# Patient Record
Sex: FEMALE | Race: WHITE | URBAN - METROPOLITAN AREA
[De-identification: names, ages, dates, MRNs, and addresses within clinical notes are randomized per-mention and may not be internally consistent; named-entity substitution may affect disease eponyms.]

---

## 2017-02-13 DIAGNOSIS — F98.8 ATTENTION DEFICIT DISORDER: ICD-10-CM

## 2017-02-13 RX ORDER — DEXTROAMPHETAMINE SACCHARATE, AMPHETAMINE ASPARTATE MONOHYDRATE, DEXTROAMPHETAMINE SULFATE AND AMPHETAMINE SULFATE 5; 5; 5; 5 MG/1; MG/1; MG/1; MG/1
20 CAPSULE, EXTENDED RELEASE ORAL DAILY
Qty: 30 CAPSULE | Refills: 0 | Status: SHIPPED | OUTPATIENT
Start: 2017-02-13 | End: 2017-03-07

## 2017-02-13 NOTE — TELEPHONE ENCOUNTER
amphetamine-dextroamphetamine (ADDERALL XR) 20 MG per 24 hr capsule      Last Written Prescription Date:  12/20/16  Last Fill Quantity: 30,   # refills: 0  Last Office Visit with [facility], [facility] or [facility] prescribing provider: 9/7/16  Future Office visit:       Routing refill request to provider for review/approval because:  Drug not on the [facility], [facility] or [facility] refill protocol or controlled substance

## 2017-02-13 NOTE — TELEPHONE ENCOUNTER
[facility] Call Center    Phone Message    Name of Caller: Dottie    Phone Number: Home number on file [phone] (home)    Best time to return call: ANY    May a detailed message be left on voicemail: no    Relation to patient: Self    Reason for Call: Medication Refill Request    Has the patient contacted the pharmacy for the refill? Yes   Name of medication being requested: Adderall  Provider who prescribed the medication: [place]  Pharmacy: Call to   Date medication is needed: ASAP         Action Taken: Message routed to:  Primary Care p [phone]

## 2017-02-14 NOTE — TELEPHONE ENCOUNTER
Rx placed at check in #1 desk. Called patient on home number to inform. Scheduled future physical with previsit lab appointment on 3/7/17.  Timothy Farris, CMA

## 2017-02-27 DIAGNOSIS — Z13.0 SCREENING FOR DEFICIENCY ANEMIA: ICD-10-CM

## 2017-02-27 DIAGNOSIS — E03.9 ACQUIRED HYPOTHYROIDISM: ICD-10-CM

## 2017-02-27 DIAGNOSIS — R73.01 ELEVATED FASTING BLOOD SUGAR: ICD-10-CM

## 2017-02-27 DIAGNOSIS — I10 ESSENTIAL HYPERTENSION WITH GOAL BLOOD PRESSURE LESS THAN 140/90: ICD-10-CM

## 2017-02-27 DIAGNOSIS — Z00.00 ROUTINE GENERAL MEDICAL EXAMINATION AT A HEALTH CARE FACILITY: ICD-10-CM

## 2017-02-27 DIAGNOSIS — R80.9 MICROALBUMINURIA: ICD-10-CM

## 2017-02-27 DIAGNOSIS — Z12.11 SCREENING FOR COLON CANCER: Primary | ICD-10-CM

## 2017-02-27 DIAGNOSIS — Z11.59 NEED FOR HEPATITIS C SCREENING TEST: ICD-10-CM

## 2017-02-27 DIAGNOSIS — Z13.1 SCREENING FOR DIABETES MELLITUS: ICD-10-CM

## 2017-03-07 ENCOUNTER — OFFICE VISIT (OUTPATIENT)
Dept: PEDIATRICS | Facility: CLINIC | Age: 57
End: 2017-03-07
Payer: COMMERCIAL

## 2017-03-07 VITALS
HEART RATE: 98 BPM | OXYGEN SATURATION: 99 % | WEIGHT: 139.5 LBS | DIASTOLIC BLOOD PRESSURE: 78 MMHG | TEMPERATURE: 97.9 F | HEIGHT: 64 IN | BODY MASS INDEX: 23.82 KG/M2 | SYSTOLIC BLOOD PRESSURE: 118 MMHG

## 2017-03-07 DIAGNOSIS — E03.9 ACQUIRED HYPOTHYROIDISM: ICD-10-CM

## 2017-03-07 DIAGNOSIS — E06.3 HASHIMOTO'S THYROIDITIS: ICD-10-CM

## 2017-03-07 DIAGNOSIS — F98.8 ATTENTION DEFICIT DISORDER: ICD-10-CM

## 2017-03-07 DIAGNOSIS — Z12.4 SCREENING FOR CERVICAL CANCER: Primary | ICD-10-CM

## 2017-03-07 DIAGNOSIS — Z00.00 ROUTINE GENERAL MEDICAL EXAMINATION AT A HEALTH CARE FACILITY: ICD-10-CM

## 2017-03-07 DIAGNOSIS — N95.1 MENOPAUSAL SYNDROME (HOT FLASHES): ICD-10-CM

## 2017-03-07 DIAGNOSIS — F51.05 INSOMNIA SECONDARY TO ANXIETY: ICD-10-CM

## 2017-03-07 DIAGNOSIS — F41.9 INSOMNIA SECONDARY TO ANXIETY: ICD-10-CM

## 2017-03-07 DIAGNOSIS — Z28.21 IMMUNIZATION NOT CARRIED OUT BECAUSE OF PATIENT REFUSAL: ICD-10-CM

## 2017-03-07 DIAGNOSIS — F41.1 GENERALIZED ANXIETY DISORDER: ICD-10-CM

## 2017-03-07 DIAGNOSIS — R73.01 ELEVATED FASTING GLUCOSE: ICD-10-CM

## 2017-03-07 DIAGNOSIS — Z80.3 FAMILY HISTORY OF BREAST CANCER: ICD-10-CM

## 2017-03-07 DIAGNOSIS — Z13.0 SCREENING FOR DEFICIENCY ANEMIA: ICD-10-CM

## 2017-03-07 DIAGNOSIS — Z11.59 NEED FOR HEPATITIS C SCREENING TEST: ICD-10-CM

## 2017-03-07 DIAGNOSIS — R73.01 ELEVATED FASTING BLOOD SUGAR: ICD-10-CM

## 2017-03-07 DIAGNOSIS — R80.9 MICROALBUMINURIA: ICD-10-CM

## 2017-03-07 DIAGNOSIS — F33.1 MAJOR DEPRESSIVE DISORDER, RECURRENT EPISODE, MODERATE (H): ICD-10-CM

## 2017-03-07 DIAGNOSIS — I10 ESSENTIAL HYPERTENSION WITH GOAL BLOOD PRESSURE LESS THAN 140/90: ICD-10-CM

## 2017-03-07 DIAGNOSIS — Z12.11 SCREENING FOR COLON CANCER: ICD-10-CM

## 2017-03-07 DIAGNOSIS — E78.5 HYPERLIPIDEMIA LDL GOAL <130: ICD-10-CM

## 2017-03-07 LAB
ALBUMIN SERPL-MCNC: 4.1 G/DL (ref 3.4–5)
ALP SERPL-CCNC: 61 U/L (ref 40–150)
ALT SERPL W P-5'-P-CCNC: 27 U/L (ref 0–50)
ANION GAP SERPL CALCULATED.3IONS-SCNC: 7 MMOL/L (ref 3–14)
AST SERPL W P-5'-P-CCNC: 14 U/L (ref 0–45)
BILIRUB SERPL-MCNC: 0.4 MG/DL (ref 0.2–1.3)
BUN SERPL-MCNC: 13 MG/DL (ref 7–30)
CALCIUM SERPL-MCNC: 9.2 MG/DL (ref 8.5–10.1)
CHLORIDE SERPL-SCNC: 106 MMOL/L (ref 94–109)
CHOLEST SERPL-MCNC: 234 MG/DL
CO2 SERPL-SCNC: 28 MMOL/L (ref 20–32)
CREAT SERPL-MCNC: 0.78 MG/DL (ref 0.52–1.04)
CREAT UR-MCNC: 226 MG/DL
DIFFERENTIAL METHOD BLD: NORMAL
EOSINOPHIL # BLD AUTO: 0.1 10E9/L (ref 0–0.7)
EOSINOPHIL NFR BLD AUTO: 2 %
ERYTHROCYTE [DISTWIDTH] IN BLOOD BY AUTOMATED COUNT: 12.6 % (ref 10–15)
GFR SERPL CREATININE-BSD FRML MDRD: 76 ML/MIN/1.7M2
GLUCOSE SERPL-MCNC: 96 MG/DL (ref 70–99)
HCT VFR BLD AUTO: 42.1 % (ref 35–47)
HCV AB SERPL QL IA: NORMAL
HDLC SERPL-MCNC: 80 MG/DL
HGB BLD-MCNC: 14.7 G/DL (ref 11.7–15.7)
LDLC SERPL CALC-MCNC: 134 MG/DL
LYMPHOCYTES # BLD AUTO: 2.7 10E9/L (ref 0.8–5.3)
LYMPHOCYTES NFR BLD AUTO: 58 %
MCH RBC QN AUTO: 31.5 PG (ref 26.5–33)
MCHC RBC AUTO-ENTMCNC: 34.9 G/DL (ref 31.5–36.5)
MCV RBC AUTO: 90 FL (ref 78–100)
MICROALBUMIN UR-MCNC: 147 MG/L
MICROALBUMIN/CREAT UR: 65.04 MG/G CR (ref 0–25)
MONOCYTES # BLD AUTO: 0.3 10E9/L (ref 0–1.3)
MONOCYTES NFR BLD AUTO: 7 %
NEUTROPHILS # BLD AUTO: 1.6 10E9/L (ref 1.6–8.3)
NEUTROPHILS NFR BLD AUTO: 33 %
NONHDLC SERPL-MCNC: 154 MG/DL
PLATELET # BLD AUTO: 272 10E9/L (ref 150–450)
PLATELET # BLD EST: NORMAL 10*3/UL
POTASSIUM SERPL-SCNC: 3.8 MMOL/L (ref 3.4–5.3)
PROT SERPL-MCNC: 7.8 G/DL (ref 6.8–8.8)
RBC # BLD AUTO: 4.67 10E12/L (ref 3.8–5.2)
RBC MORPH BLD: NORMAL
SODIUM SERPL-SCNC: 141 MMOL/L (ref 133–144)
T4 FREE SERPL-MCNC: 1.08 NG/DL (ref 0.76–1.46)
TRIGL SERPL-MCNC: 102 MG/DL
TSH SERPL DL<=0.005 MIU/L-ACNC: 5.38 MU/L (ref 0.4–4)
WBC # BLD AUTO: 4.7 10E9/L (ref 4–11)

## 2017-03-07 PROCEDURE — 80050 GENERAL HEALTH PANEL: CPT | Performed by: FAMILY MEDICINE

## 2017-03-07 PROCEDURE — G0145 SCR C/V CYTO,THINLAYER,RESCR: HCPCS | Performed by: FAMILY MEDICINE

## 2017-03-07 PROCEDURE — 36415 COLL VENOUS BLD VENIPUNCTURE: CPT | Performed by: FAMILY MEDICINE

## 2017-03-07 PROCEDURE — 82043 UR ALBUMIN QUANTITATIVE: CPT | Performed by: FAMILY MEDICINE

## 2017-03-07 PROCEDURE — 80061 LIPID PANEL: CPT | Performed by: FAMILY MEDICINE

## 2017-03-07 PROCEDURE — 87624 HPV HI-RISK TYP POOLED RSLT: CPT | Performed by: FAMILY MEDICINE

## 2017-03-07 PROCEDURE — 99396 PREV VISIT EST AGE 40-64: CPT | Performed by: FAMILY MEDICINE

## 2017-03-07 PROCEDURE — 84439 ASSAY OF FREE THYROXINE: CPT | Performed by: FAMILY MEDICINE

## 2017-03-07 PROCEDURE — 86803 HEPATITIS C AB TEST: CPT | Performed by: FAMILY MEDICINE

## 2017-03-07 RX ORDER — SIMVASTATIN 10 MG
TABLET ORAL
Qty: 45 TABLET | Refills: 3 | Status: SHIPPED | OUTPATIENT
Start: 2017-03-07 | End: 2017-09-26

## 2017-03-07 RX ORDER — AMLODIPINE BESYLATE 10 MG/1
TABLET ORAL
Qty: 90 TABLET | Refills: 1 | Status: SHIPPED | OUTPATIENT
Start: 2017-03-07 | End: 2017-09-26

## 2017-03-07 RX ORDER — DEXTROAMPHETAMINE SACCHARATE, AMPHETAMINE ASPARTATE MONOHYDRATE, DEXTROAMPHETAMINE SULFATE AND AMPHETAMINE SULFATE 5; 5; 5; 5 MG/1; MG/1; MG/1; MG/1
20 CAPSULE, EXTENDED RELEASE ORAL DAILY
Qty: 30 CAPSULE | Refills: 0 | Status: SHIPPED | OUTPATIENT
Start: 2017-03-07 | End: 2017-05-23

## 2017-03-07 ASSESSMENT — PATIENT HEALTH QUESTIONNAIRE - PHQ9: 5. POOR APPETITE OR OVEREATING: MORE THAN HALF THE DAYS

## 2017-03-07 ASSESSMENT — ANXIETY QUESTIONNAIRES
6. BECOMING EASILY ANNOYED OR IRRITABLE: MORE THAN HALF THE DAYS
7. FEELING AFRAID AS IF SOMETHING AWFUL MIGHT HAPPEN: NOT AT ALL
2. NOT BEING ABLE TO STOP OR CONTROL WORRYING: MORE THAN HALF THE DAYS
1. FEELING NERVOUS, ANXIOUS, OR ON EDGE: MORE THAN HALF THE DAYS
5. BEING SO RESTLESS THAT IT IS HARD TO SIT STILL: MORE THAN HALF THE DAYS
GAD7 TOTAL SCORE: 12
3. WORRYING TOO MUCH ABOUT DIFFERENT THINGS: MORE THAN HALF THE DAYS

## 2017-03-07 ASSESSMENT — PAIN SCALES - GENERAL: PAINLEVEL: NO PAIN (0)

## 2017-03-07 NOTE — PROGRESS NOTES
SUBJECTIVE:     CC: Dottie Webb is an 56 year old woman who presents for preventive health visit.     Healthy Habits:    Do you get at least three servings of calcium containing foods daily (dairy, green leafy vegetables, etc.)? yes    Amount of exercise or daily activities, outside of work: none    Problems taking medications regularly No    Medication side effects: No    Have you had an eye exam in the past two years? yes    Do you see a dentist twice per year? yes    Do you have sleep apnea, excessive snoring or daytime drowsiness?no        Hyperlipidemia Follow-Up      Rate your low fat/cholesterol diet?: good    Taking statin?  Yes, no muscle aches from statin, but not daily conssitently    Other lipid medications/supplements?:  none     Hypertension Follow-up      Outpatient blood pressures are not being checked.    Low Salt Diet: no added salt       Depression and Anxiety Follow-Up    Status since last visit: No change    Other associated symptoms:None    Complicating factors:     Significant life event: No     Current substance abuse: None    PHQ-9 SCORE 6/14/2011   Total Score 4     No flowsheet data found.     PHQ-9  English      PHQ-9   Any Language     GAD7       Today's PHQ-2 Score:   PHQ-2 ( 1999 Pfizer) 3/7/2017 3/1/2016   Q1: Little interest or pleasure in doing things 1 0   Q2: Feeling down, depressed or hopeless 1 0   PHQ-2 Score 2 0       Abuse: Current or Past(Physical, Sexual or Emotional)- No  Do you feel safe in your environment - Yes    Social History   Substance Use Topics     Smoking status: Former Smoker     Quit date: 10/1/1999     Smokeless tobacco: Never Used     Alcohol use Yes      Comment: 2-3 drinks per wk     The patient does not drink >3 drinks per day nor >7 drinks per week.    Recent Labs   Lab Test  09/07/16   0927  08/28/15   0842  06/24/14   0921   CHOL  223*  219*  184   HDL  67  59  64   LDL  128*  112  79   TRIG  141  241*  205*   CHOLHDLRATIO   --   3.7  2.9    [facility]  156*   --    --        Reviewed orders with patient.  Reviewed health maintenance and updated orders accordingly - Yes    Mammo Decision Support:  Patient over age 50, mutual decision to screen reflected in health maintenance.    Pertinent mammograms are reviewed under the imaging tab.  History of abnormal Pap smear: NO - age 30- 65 PAP every 3 years recommended    Reviewed and updated as needed this visit by clinical staff  Tobacco  Allergies  Meds  Med Hx  Surg Hx  Fam Hx  Soc Hx        Reviewed and updated as needed this visit by Provider          Past Medical History   Diagnosis Date     Elevated lipids      Hypertension      Hypothyroid       Past Surgical History   Procedure Laterality Date     Gyn surgery       2010 ablation     Tubal ligation  2003     Obstetric History     No data available          ROS:  C: NEGATIVE for fever, chills, change in weight  I: NEGATIVE for worrisome rashes, moles or lesions  E: NEGATIVE for vision changes or irritation  ENT: NEGATIVE for ear, mouth and throat problems  R: NEGATIVE for significant cough or SOB  B: NEGATIVE for masses, tenderness or discharge  CV: NEGATIVE for chest pain, palpitations or peripheral edema  CV: Hx HTN  GI: NEGATIVE for nausea, abdominal pain, heartburn, or change in bowel habits  : NEGATIVE for unusual urinary or vaginal symptoms. No vaginal bleeding.  M: NEGATIVE for significant arthralgias or myalgia  N: NEGATIVE for weakness, dizziness or paresthesias  E: NEGATIVE for temperature intolerance, skin/hair changes  ENDOCRINE: Hx thyroid disease  H: NEGATIVE for bleeding problems  P: NEGATIVE for changes in mood or affect  PSYCHIATRIC: h/o ADD, HX anxiety and HX depression     Problem list, Medication list, Allergies, and Medical/Social/Surgical histories reviewed in [facility] and updated as appropriate.  Labs reviewed in EPIC  BP Readings from Last 3 Encounters:   03/07/17 118/78   09/07/16 (!) 142/94   03/01/16 106/70    Wt Readings from  Last 3 Encounters:   03/07/17 139 lb 8 oz (63.3 kg)   09/07/16 143 lb 9.6 oz (65.1 kg)   03/01/16 139 lb 11.2 oz (63.4 kg)                  Patient Active Problem List   Diagnosis     Hashimoto's thyroiditis     Generalized anxiety disorder     Chronic fatigue     Hyperlipidemia LDL goal <160     Plantar fasciitis     Anxiety     Attention deficit disorder     Menopausal syndrome (hot flashes)     Elevated fasting glucose     Acquired hypothyroidism     Microalbuminuria     Essential hypertension with goal blood pressure less than 140/90     Major depressive disorder, recurrent episode, moderate (H)     Insomnia secondary to anxiety     Elevated fasting blood sugar     Family history of breast cancer     Past Surgical History   Procedure Laterality Date     Gyn surgery       2010 ablation     Tubal ligation  2003       Social History   Substance Use Topics     Smoking status: Former Smoker     Quit date: 10/1/1999     Smokeless tobacco: Never Used     Alcohol use Yes      Comment: 2-3 drinks per wk     Family History   Problem Relation Age of Onset     C.A.D. Father      Hypertension Father      Lipids Father      CEREBROVASCULAR DISEASE Maternal Grandmother      Hypertension Brother      Lipids Brother      Blood Disease Brother      hepatitis C     CANCER Sister      throat, breast, upper GI     HEART DISEASE Sister      rhythm issues     Hypertension Mother      DIABETES Mother      DIABETES Maternal Grandfather      Prostate Cancer Paternal Grandfather          Current Outpatient Prescriptions   Medication Sig Dispense Refill     amphetamine-dextroamphetamine (ADDERALL XR) 20 MG per 24 hr capsule Take 1 capsule (20 mg) by mouth daily 30 capsule 0     amLODIPine (NORVASC) 10 MG tablet TAKE ONE TABLET BY MOUTH ONE TIME DAILY.  ASCEND [facility] ONLY 90 tablet 1     simvastatin (ZOCOR) 10 MG tablet take one-half tablet by mouth nightly at bedtime. 45 tablet 3     Omega 3-6-9 Fatty Acids (OMEGA 3-6-9 PO) Take 1 capsule by  "mouth daily       Coenzyme Q10 (CO Q 10 PO) Take 1 capsule by mouth daily       Probiotic Product (PROBIOTIC DAILY PO) Take 1 capsule by mouth daily       levothyroxine (SYNTHROID, LEVOTHROID) 75 MCG tablet Take 1 tablet (75 mcg) by mouth daily 90 tablet 3     ORDER FOR DME 2 Units. splint for plantar fascitis 2 Units 0     aspirin 81 MG tablet Take 1 tablet by mouth daily. 100 tablet 3     ACE NOT PRESCRIBED, INTENTIONAL, by Other route continuous prn.  0     CLARITIN 10 MG PO CAPS 1 CAPSULE DAILY PRN       [DISCONTINUED] amLODIPine (NORVASC) 10 MG tablet TAKE ONE TABLET BY MOUTH ONE TIME DAILY.  ASCEND [facility] ONLY 90 tablet 1     [DISCONTINUED] simvastatin (ZOCOR) 10 MG tablet take one-half tablet by mouth nightly at bedtime. 45 tablet 3     Allergies   Allergen Reactions     Lisinopril      Severe dry cough     Recent Labs   Lab Test  03/07/17   0854  09/07/16   0928  09/07/16   0927   08/28/15   0842   A1C   --    --   5.2   --   5.6   LDL  134*   --   128*   --   112   HDL  80   --   67   --   59   TRIG  102   --   141   --   241*   ALT  27   --   28   --   27   CR  0.78   --   0.73   < >   --    GFRESTIMATED  76   --   83   < >   --    GFRESTBLACK  >90   GFR Calc     --   >90   GFR Calc     < >   --    POTASSIUM  3.8   --   3.6   < >   --    TSH  5.38*  2.82   --    --   2.03    < > = values in this interval not displayed.      OBJECTIVE:     /78  Pulse 98  Temp 97.9  F (36.6  C) (Oral)  Ht 5' 3.75\" (1.619 m)  Wt 139 lb 8 oz (63.3 kg)  SpO2 99%  BMI 24.13 kg/m2  EXAM:  GENERAL APPEARANCE: healthy, alert and no distress  EYES: Eyes grossly normal to inspection, PERRL and conjunctivae and sclerae normal  HENT: ear canals and TM's normal, nose and mouth without ulcers or lesions, oropharynx clear and oral mucous membranes moist  NECK: no adenopathy, no asymmetry, masses, or scars and thyroid normal to palpation  RESP: lungs clear to auscultation - no rales, rhonchi or " wheezes  BREAST: normal without masses, tenderness or nipple discharge and no palpable axillary masses or adenopathy  CV: regular rate and rhythm, normal S1 S2, no S3 or S4, no murmur, click or rub, no peripheral edema and peripheral pulses strong  ABDOMEN: soft, nontender, no hepatosplenomegaly, no masses and bowel sounds normal   (female): normal female external genitalia, normal urethral meatus, vaginal mucosal atrophy noted, normal cervix, adnexae, and uterus without masses or abnormal discharge  MS: no musculoskeletal defects are noted and gait is age appropriate without ataxia  SKIN: no suspicious lesions or rashes  NEURO: Normal strength and tone, sensory exam grossly normal, mentation intact and speech normal  PSYCH: mentation appears normal and affect normal/bright    ASSESSMENT/PLAN:     1. Routine general medical examination at a health care facility  Discussed on regular exercises, daily calcium intake, healthy eating, self breast exams monthly and routine dental checks  - Pap imaged thin layer screen with HPV - recommended age 30 - 65 years (select HPV order below)  - HPV High Risk Types DNA Cervical    2. Attention deficit disorder  Stable, continue Adderall 20 mg daily, recheck in 6 months or sooner if needed  - amphetamine-dextroamphetamine (ADDERALL XR) 20 MG per 24 hr capsule; Take 1 capsule (20 mg) by mouth daily  Dispense: 30 capsule; Refill: 0    3. Essential hypertension with goal blood pressure less than 140/90  Last Basic Metabolic Panel:  Lab Results   Component Value Date     03/07/2017      Lab Results   Component Value Date    POTASSIUM 3.8 03/07/2017     Lab Results   Component Value Date    CHLORIDE 106 03/07/2017     Lab Results   Component Value Date    LEILANI 9.2 03/07/2017     Lab Results   Component Value Date    CO2 28 03/07/2017     Lab Results   Component Value Date    BUN 13 03/07/2017     Lab Results   Component Value Date    CR 0.78 03/07/2017     Lab Results   Component  Value Date    GLC 96 03/07/2017       Blood pressure is at goal, continue amlodipine 10 mg daily, low-salt diet, regular exercise, recheck in 6 months. Reviewed normal BMP lab results with patient  - amLODIPine (NORVASC) 10 MG tablet; TAKE ONE TABLET BY MOUTH ONE TIME DAILY.  ASCEND [facility] ONLY  Dispense: 90 tablet; Refill: 1    4. Major depressive disorder, recurrent episode, moderate (H)  Secondary to situational stressors  Patient wants to continue to monitor, continue relaxation techniques, regular exercises  Recheck in 6 months or sooner if needed      5. Generalized anxiety disorder  as above      6. Screening for cervical cancer    - Pap imaged thin layer screen with HPV - recommended age 30 - 65 years (select HPV order below)  - HPV High Risk Types DNA Cervical    7. Hyperlipidemia LDL goal <160  LDL Cholesterol Calculated   Date Value Ref Range Status   03/07/2017 134 (H) <100 mg/dL Final     Comment:     Above desirable:  100-129 mg/dl   Borderline High:  130-159 mg/dL   High:             160-189 mg/dL   Very high:       >189 mg/dl     ]  Emphasized on medication compliance in taking medications every day  Continue Zocor 5 mg daily, recheck lipids in 6 months  Continue healthy eating and regular exercise  - simvastatin (ZOCOR) 10 MG tablet; take one-half tablet by mouth nightly at bedtime.  Dispense: 45 tablet; Refill: 3    8. Family history of breast cancer  Multiple family members including her 61-year-old sister with breast cancers  Recommended genetic counseling for further evaluation  - CANCER RISK MGMT/CANCER GENETIC COUNSELING REFERRAL    9. Hashimoto's thyroiditis  Will f/u on results and call with recommendations.      10. Menopausal syndrome (hot flashes)  Patient is coping well, continue to monitor    11. Elevated fasting glucose  Glucose   Date Value Ref Range Status   03/07/2017 96 70 - 99 mg/dL Final     Comment:     Fasting specimen   ]      Negative test results reviewed with the patient and  "reassured.      12. Acquired hypothyroidism  Will f/u on results and call with recommendations.      13. Insomnia secondary to anxiety  Resolved, Continue sleep hygiene and monitor      (Z28.21) Immunization not carried out because of patient refusal  Comment:   Plan:  Patient refused Annual influenza and overdue TDAP          COUNSELING:   Reviewed preventive health counseling, as reflected in patient instructions  Special attention given to:        Regular exercise       Healthy diet/nutrition       Osteoporosis Prevention/Bone Health       Colon cancer screening       Consider Hep C screening for patients born between 1945 and 1965       (Debra)menopause management       The 10-year ASCVD risk score (Shirley LEVI JrJonel, et al., 2013) is: 1.9%    Values used to calculate the score:      Age: 56 years      Sex: Female      Is Non- : No      Diabetic: No      Tobacco smoker: No      Systolic Blood Pressure: 118 mmHg      Is Prescribed Antihypertensives: Yes      HDL Cholesterol: 80 mg/dL      Total Cholesterol: 234 mg/dL       Advance Care Planning         reports that she quit smoking about 17 years ago. She has never used smokeless tobacco.    Estimated body mass index is 24.13 kg/(m^2) as calculated from the following:    Height as of this encounter: 5' 3.75\" (1.619 m).    Weight as of this encounter: 139 lb 8 oz (63.3 kg).       Counseling Resources:  ATP IV Guidelines  Pooled Cohorts Equation Calculator  Breast Cancer Risk Calculator  FRAX Risk Assessment  ICSI Preventive Guidelines  Dietary Guidelines for Americans, 2010  USDA's MyPlate  ASA Prophylaxis  Lung CA Screening    Iraida Stark MD  [facility]  Chart documentation done in part with Dragon Voice recognition Software. Although reviewed after completion, some word and grammatical error may remain.    "

## 2017-03-07 NOTE — MR AVS SNAPSHOT
After Visit Summary   3/7/2017    Dottie Webb    MRN: [id number]           Patient Information     Date Of Birth          1960        Visit Information        Provider Department      3/7/2017 9:20 AM Iraida Stark MD [facility]        Today's Diagnoses     Screening for cervical cancer    -  1    Routine general medical examination at a health care facility        Attention deficit disorder        Essential hypertension with goal blood pressure less than 140/90        Major depressive disorder, recurrent episode, moderate (H)        Generalized anxiety disorder        Hyperlipidemia LDL goal <160        Family history of breast cancer          Care Instructions    Get the FIT test done today    Schedule for mammogram  Schedule for genetic counseling  Schedule for recheck in 6 months      Preventive Health Recommendations  Female Ages 50 - 64    Yearly exam: See your health care provider every year in order to  o Review health changes.   o Discuss preventive care.    o Review your medicines if your doctor has prescribed any.      Get a Pap test every three years (unless you have an abnormal result and your provider advises testing more often).    If you get Pap tests with HPV test, you only need to test every 5 years, unless you have an abnormal result.     You do not need a Pap test if your uterus was removed (hysterectomy) and you have not had cancer.    You should be tested each year for STDs (sexually transmitted diseases) if you're at risk.     Have a mammogram every 1 to 2 years.    Have a colonoscopy at age 50, or have a yearly FIT test (stool test). These exams screen for colon cancer.      Have a cholesterol test every 5 years, or more often if advised.    Have a diabetes test (fasting glucose) every three years. If you are at risk for diabetes, you should have this test more often.     If you are at risk for osteoporosis (brittle bone disease), think about  having a bone density scan (DEXA).    Shots: Get a flu shot each year. Get a tetanus shot every 10 years.    Nutrition:     Eat at least 5 servings of fruits and vegetables each day.    Eat whole-grain bread, whole-wheat pasta and brown rice instead of white grains and rice.    Talk to your provider about Calcium and Vitamin D.     Lifestyle    Exercise at least 150 minutes a week (30 minutes a day, 5 days a week). This will help you control your weight and prevent disease.    Limit alcohol to one drink per day.    No smoking.     Wear sunscreen to prevent skin cancer.     See your dentist every six months for an exam and cleaning.    See your eye doctor every 1 to 2 years.              Follow-ups after your visit        Additional Services     CANCER RISK MGMT/CANCER GENETIC COUNSELING REFERRAL       Your provider has referred you to the Cancer Risk Management Program - Cancer Genetic Counseling.    Reason for Referral: family h/o multiple cancers    We have a sent a notice to a staff member of the Cancer Risk Management Program to give you a call to assist with scheduling your appointment.  You may also call  [phone] ([phone]) to initiate scheduling.    Please be aware that coverage of these services is subject to the terms and limitations of your health insurance plan.  Call member services at your health plan with any benefit or coverage questions.      Please bring the completed family history sheet to your appointment in addition to any available outside medical records documenting your cancer diagnosis.                  Future tests that were ordered for you today     Open Future Orders        Priority Expected Expires Ordered    Fecal colorectal cancer screen (FIT) Routine 3/7/2017 3/7/2018 3/7/2017            Who to contact     If you have questions or need follow up information about today's clinic visit or your schedule please contact [facility] directly at  "[phone].  Normal or non-critical lab and imaging results will be communicated to you by [employer], letter or phone within 4 business days after the clinic has received the results. If you do not hear from us within 7 days, please contact the clinic through [employer] or phone. If you have a critical or abnormal lab result, we will notify you by phone as soon as possible.  Submit refill requests through [employer] or call your pharmacy and they will forward the refill request to us. Please allow 3 business days for your refill to be completed.          Additional Information About Your Visit        [employer] Information     [employer] is an electronic gateway that provides easy, online access to your medical records. With [employer], you can request a clinic appointment, read your test results, renew a prescription or communicate with your care team.     To sign up for [employer] visit the website at [email or web address]   You will be asked to enter the access code listed below, as well as some personal information. Please follow the directions to create your username and password.     Your access code is: [email or web address]  Expires: 2017  9:44 AM     Your access code will  in 90 days. If you need help or a new code, please contact your [facility] or call [phone] for assistance.        Care EveryWhere ID     This is your Care EveryWhere ID. This could be used by other organizations to access your [facility] medical records  [id number]        Your Vitals Were     Pulse Temperature Height Pulse Oximetry BMI (Body Mass Index)       98 97.9  F (36.6  C) (Oral) 5' 3.75\" (1.619 m) 99% 24.13 kg/m2        Blood Pressure from Last 3 Encounters:   17 118/78   16 (!) 142/94   16 106/70    Weight from Last 3 Encounters:   17 139 lb 8 oz (63.3 kg)   16 143 lb 9.6 oz (65.1 kg)   16 139 lb 11.2 oz (63.4 kg)              We Performed the Following     CANCER RISK " MGMT/CANCER GENETIC COUNSELING REFERRAL     HPV High Risk Types DNA Cervical     Pap imaged thin layer screen with HPV - recommended age 30 - 65 years (select HPV order below)          Where to get your medicines      These medications were sent to [facility] [id number] [address] [facility] - [place] - [address] [address]  [address] [address], [facility] [address]     Phone:  [phone]     amLODIPine 10 MG tablet    simvastatin 10 MG tablet         Some of these will need a paper prescription and others can be bought over the counter.  Ask your nurse if you have questions.     Bring a paper prescription for each of these medications     amphetamine-dextroamphetamine 20 MG per 24 hr capsule          Primary Care Provider Office Phone # Fax #    Iraida Stark [phone]       [facility] [address]  [facility] [address]        Thank you!     Thank you for choosing [facility]  for your care. Our goal is always to provide you with excellent care. Hearing back from our patients is one way we can continue to improve our services. Please take a few minutes to complete the written survey that you may receive in the mail after your visit with us. Thank you!             Your Updated Medication List - Protect others around you: Learn how to safely use, store and throw away your medicines at www.disposemymeds.org.          This list is accurate as of: 3/7/17  9:44 AM.  Always use your most recent med list.                   Brand Name Dispense Instructions for use    * ACE NOT PRESCRIBED (INTENTIONAL)      by Other route continuous prn.       amLODIPine 10 MG tablet    NORVASC    90 tablet    TAKE ONE TABLET BY MOUTH ONE TIME DAILY.  [facility]       amphetamine-dextroamphetamine 20 MG per 24 hr capsule    ADDERALL XR    30 capsule    Take 1 capsule (20 mg) by mouth daily       aspirin 81 MG tablet     100 tablet    Take 1 tablet by mouth daily.       CLARITIN 10 MG capsule    Generic drug:  loratadine      1 CAPSULE DAILY PRN       CO Q 10 PO      Take 1 capsule by mouth daily       levothyroxine 75 MCG tablet    SYNTHROID/LEVOTHROID    90 tablet    Take 1 tablet (75 mcg) by mouth daily       OMEGA 3-6-9 PO      Take 1 capsule by mouth daily       * order for DME     2 Units    2 Units. splint for plantar fascitis       PROBIOTIC DAILY PO      Take 1 capsule by mouth daily       simvastatin 10 MG tablet    ZOCOR    45 tablet    take one-half tablet by mouth nightly at bedtime.       * Notice:  This list has 2 medication(s) that are the same as other medications prescribed for you. Read the directions carefully, and ask your doctor or other care provider to review them with you.

## 2017-03-07 NOTE — PATIENT INSTRUCTIONS
Get the FIT test done today    Schedule for mammogram  Schedule for genetic counseling  Schedule for recheck in 6 months      Preventive Health Recommendations  Female Ages 50 - 64    Yearly exam: See your health care provider every year in order to  o Review health changes.   o Discuss preventive care.    o Review your medicines if your doctor has prescribed any.      Get a Pap test every three years (unless you have an abnormal result and your provider advises testing more often).    If you get Pap tests with HPV test, you only need to test every 5 years, unless you have an abnormal result.     You do not need a Pap test if your uterus was removed (hysterectomy) and you have not had cancer.    You should be tested each year for STDs (sexually transmitted diseases) if you're at risk.     Have a mammogram every 1 to 2 years.    Have a colonoscopy at age 50, or have a yearly FIT test (stool test). These exams screen for colon cancer.      Have a cholesterol test every 5 years, or more often if advised.    Have a diabetes test (fasting glucose) every three years. If you are at risk for diabetes, you should have this test more often.     If you are at risk for osteoporosis (brittle bone disease), think about having a bone density scan (DEXA).    Shots: Get a flu shot each year. Get a tetanus shot every 10 years.    Nutrition:     Eat at least 5 servings of fruits and vegetables each day.    Eat whole-grain bread, whole-wheat pasta and brown rice instead of white grains and rice.    Talk to your provider about Calcium and Vitamin D.     Lifestyle    Exercise at least 150 minutes a week (30 minutes a day, 5 days a week). This will help you control your weight and prevent disease.    Limit alcohol to one drink per day.    No smoking.     Wear sunscreen to prevent skin cancer.     See your dentist every six months for an exam and cleaning.    See your eye doctor every 1 to 2 years.

## 2017-03-07 NOTE — NURSING NOTE
"Chief Complaint   Patient presents with     Physical     Genetic Counseling     Patient sister hx of cancer, trae just tested positive for gene for breast cancer       Initial /78  Pulse 98  Temp 97.9  F (36.6  C) (Oral)  Ht 5' 3.75\" (1.619 m)  Wt 139 lb 8 oz (63.3 kg)  SpO2 99%  BMI 24.13 kg/m2 Estimated body mass index is 24.13 kg/(m^2) as calculated from the following:    Height as of this encounter: 5' 3.75\" (1.619 m).    Weight as of this encounter: 139 lb 8 oz (63.3 kg).  BP completed using cuff size: natalie Farris CMA    "

## 2017-03-08 LAB
COPATH REPORT: NORMAL
PAP: NORMAL

## 2017-03-08 ASSESSMENT — ANXIETY QUESTIONNAIRES: GAD7 TOTAL SCORE: 12

## 2017-03-08 ASSESSMENT — PATIENT HEALTH QUESTIONNAIRE - PHQ9: SUM OF ALL RESPONSES TO PHQ QUESTIONS 1-9: 7

## 2017-03-10 LAB
FINAL DIAGNOSIS: NORMAL
HPV HR 12 DNA CVX QL NAA+PROBE: NEGATIVE
HPV16 DNA SPEC QL NAA+PROBE: NEGATIVE
HPV18 DNA SPEC QL NAA+PROBE: NEGATIVE
SPECIMEN DESCRIPTION: NORMAL

## 2017-05-23 ENCOUNTER — TELEPHONE (OUTPATIENT)
Dept: PEDIATRICS | Facility: CLINIC | Age: 57
End: 2017-05-23

## 2017-05-23 DIAGNOSIS — F98.8 ATTENTION DEFICIT DISORDER: ICD-10-CM

## 2017-05-23 RX ORDER — DEXTROAMPHETAMINE SACCHARATE, AMPHETAMINE ASPARTATE MONOHYDRATE, DEXTROAMPHETAMINE SULFATE AND AMPHETAMINE SULFATE 5; 5; 5; 5 MG/1; MG/1; MG/1; MG/1
20 CAPSULE, EXTENDED RELEASE ORAL DAILY
Qty: 30 CAPSULE | Refills: 0 | Status: SHIPPED | OUTPATIENT
Start: 2017-05-23 | End: 2017-06-23

## 2017-05-23 NOTE — TELEPHONE ENCOUNTER
[facility] Call Center    Phone Message    Name of Caller: Dottie    Phone Number: Home number on file [phone] (home)    Best time to return call: Any    May a detailed message be left on voicemail: yes    Relation to patient: Self    Reason for Call: Medication Refill Request    Has the patient contacted the pharmacy for the refill? Yes   Name of medication being requested: amphetamine-dextroamphetamine (ADDERALL XR) 20 MG per 24 hr capsule [31158] (Order [id number])     Provider who prescribed the medication: Dr. Stark  Pharmacy: [facility]  Date medication is needed: ASA Patient will  Rx at clinic once ready.          Action Taken: Message routed to:  Primary Care p [id number]

## 2017-05-23 NOTE — TELEPHONE ENCOUNTER
amphetamine-dextroamphetamine (ADDERALL XR) 20 MG per 24 hr capsule  Last Written Prescription Date:  03/07/17  Last Fill Quantity: 30 Capsules,   # refills: 0  Last Office Visit with [facility] or [facility] prescribing provider: Dr. Stark  Future Office visit:   03/07/17    ASAP PATIENT WILL  RX AT CLINIC WHEN READY    Routing refill request to provider for review/approval because:  Drug not on the [facility] or [facility] refill protocol or controlled substance    Routed to Dr. Lincoln Alvarenga CMA

## 2017-06-23 DIAGNOSIS — F98.8 ATTENTION DEFICIT DISORDER: ICD-10-CM

## 2017-06-23 PROCEDURE — 82274 ASSAY TEST FOR BLOOD FECAL: CPT | Performed by: FAMILY MEDICINE

## 2017-06-23 RX ORDER — DEXTROAMPHETAMINE SACCHARATE, AMPHETAMINE ASPARTATE MONOHYDRATE, DEXTROAMPHETAMINE SULFATE AND AMPHETAMINE SULFATE 5; 5; 5; 5 MG/1; MG/1; MG/1; MG/1
20 CAPSULE, EXTENDED RELEASE ORAL DAILY
Qty: 30 CAPSULE | Refills: 0 | Status: SHIPPED | OUTPATIENT
Start: 2017-06-23 | End: 2017-08-04

## 2017-06-23 NOTE — TELEPHONE ENCOUNTER
Rx placed at check in #1 desk. Called patient on home number to inform. Advised clinic closes at 5pm today. Patient states understanding.  Timothy Farris, CMA

## 2017-06-23 NOTE — TELEPHONE ENCOUNTER
[facility] Call Center    Phone Message    Name of Caller: Dottie    Phone Number: Home number on file [phone] (home)    Best time to return call: Any    May a detailed message be left on voicemail: yes    Relation to patient: Self    Reason for Call: Medication Refill Request  amphetamine-dextroamphetamine (ADDERALL XR) 20 MG per 24 hr capsule  Has the patient contacted the pharmacy for the refill? No - Dottie is requesting to pick local print up at [facility].  Requesting a call when prescription is ready.        Action Taken: Message routed to:  Primary Care p [phone]

## 2017-06-23 NOTE — TELEPHONE ENCOUNTER
Routing refill request to covering providers for review/approval because:  Drug not on the [facility] refill protocol     Mackenzie Gauthier RN, [facility]

## 2017-06-24 LAB — HEMOCCULT STL QL IA: NEGATIVE

## 2017-06-26 DIAGNOSIS — Z12.11 SCREENING FOR COLON CANCER: ICD-10-CM

## 2017-08-04 DIAGNOSIS — F98.8 ATTENTION DEFICIT DISORDER (ADD) WITHOUT HYPERACTIVITY: Primary | ICD-10-CM

## 2017-08-04 RX ORDER — DEXTROAMPHETAMINE SACCHARATE, AMPHETAMINE ASPARTATE MONOHYDRATE, DEXTROAMPHETAMINE SULFATE AND AMPHETAMINE SULFATE 5; 5; 5; 5 MG/1; MG/1; MG/1; MG/1
20 CAPSULE, EXTENDED RELEASE ORAL DAILY
Qty: 30 CAPSULE | Refills: 0 | Status: SHIPPED | OUTPATIENT
Start: 2017-08-04 | End: 2017-09-26

## 2017-08-04 NOTE — TELEPHONE ENCOUNTER
Patient advised that the script for Adderall XR 20 mg has been put at the , [facility] for her to .  Patient states she will  the script on Monday 08/07/17.    Shannon Alvarenga CMA

## 2017-08-04 NOTE — TELEPHONE ENCOUNTER
Pending Prescriptions:                       Disp   Refills    amphetamine-dextroamphetamine (ADDERALL XR*30 cap*0        Sig: Take 1 capsule (20 mg) by mouth daily  Routing refill request to provider for review/approval because:  Drug not on the [facility] refill protocol                  Last Written Prescription Date: 6/23/17  Last Fill Quantity: 30, # refills: 0    Last [facility] Visit [facility] or [facility] prescribing provider:  3/7/17   Future Office Visit:    Next 5 appointments (look out 90 days)     Sep 12, 2017  9:00 AM CDT   Return Visit with Iraida Stark MD   [facility]    [address] [place] [id number] [phone]                    BP Readings from Last 3 Encounters:   03/07/17 118/78   09/07/16 (!) 142/94   03/01/16 106/70     Lidya De La Paz RN   [facility], Primary Care

## 2017-09-11 ENCOUNTER — TELEPHONE (OUTPATIENT)
Dept: PEDIATRICS | Facility: CLINIC | Age: 57
End: 2017-09-11

## 2017-09-11 NOTE — TELEPHONE ENCOUNTER
Patient called to ask if she needed to do fasting labs at there upcoming appointment on 9/26/17. According to documentation from last office visit and health maintenance, patient is not due for fasting labs until 3/2018. Patient verbalized understanding. Merna Vila RN

## 2017-09-11 NOTE — TELEPHONE ENCOUNTER
[facility] Call Center    Phone Message    Name of Caller: Dottie    Phone Number: Cell number on file:    Telephone Information:   Mobile [phone]       Best time to return call: any    May a detailed message be left on voicemail: yes    Relation to patient: Self    Reason for Call: Other: patient is calling requesting to speak with clinic about labs. Please advise.     Action Taken: Message routed to:  Primary Care p [phone]

## 2017-09-15 DIAGNOSIS — E03.9 ACQUIRED HYPOTHYROIDISM: ICD-10-CM

## 2017-09-15 DIAGNOSIS — E78.00 HYPERCHOLESTEREMIA: ICD-10-CM

## 2017-09-15 DIAGNOSIS — R80.9 MICROALBUMINURIA: ICD-10-CM

## 2017-09-15 DIAGNOSIS — I10 ESSENTIAL HYPERTENSION WITH GOAL BLOOD PRESSURE LESS THAN 140/90: ICD-10-CM

## 2017-09-15 DIAGNOSIS — E06.3 HASHIMOTO'S THYROIDITIS: Primary | ICD-10-CM

## 2017-09-26 ENCOUNTER — OFFICE VISIT (OUTPATIENT)
Dept: PEDIATRICS | Facility: CLINIC | Age: 57
End: 2017-09-26
Payer: COMMERCIAL

## 2017-09-26 VITALS
OXYGEN SATURATION: 97 % | BODY MASS INDEX: 24.01 KG/M2 | TEMPERATURE: 97.5 F | SYSTOLIC BLOOD PRESSURE: 110 MMHG | DIASTOLIC BLOOD PRESSURE: 64 MMHG | WEIGHT: 138.8 LBS | HEART RATE: 75 BPM

## 2017-09-26 DIAGNOSIS — E78.00 HYPERCHOLESTEREMIA: ICD-10-CM

## 2017-09-26 DIAGNOSIS — F41.1 GENERALIZED ANXIETY DISORDER: ICD-10-CM

## 2017-09-26 DIAGNOSIS — I10 ESSENTIAL HYPERTENSION WITH GOAL BLOOD PRESSURE LESS THAN 140/90: ICD-10-CM

## 2017-09-26 DIAGNOSIS — E78.5 HYPERLIPIDEMIA LDL GOAL <130: ICD-10-CM

## 2017-09-26 DIAGNOSIS — E03.9 ACQUIRED HYPOTHYROIDISM: ICD-10-CM

## 2017-09-26 DIAGNOSIS — F98.8 ATTENTION DEFICIT DISORDER (ADD) WITHOUT HYPERACTIVITY: Primary | ICD-10-CM

## 2017-09-26 DIAGNOSIS — Z12.31 VISIT FOR SCREENING MAMMOGRAM: ICD-10-CM

## 2017-09-26 DIAGNOSIS — R80.9 MICROALBUMINURIA: ICD-10-CM

## 2017-09-26 DIAGNOSIS — E06.3 HASHIMOTO'S THYROIDITIS: ICD-10-CM

## 2017-09-26 LAB
ANION GAP SERPL CALCULATED.3IONS-SCNC: 11 MMOL/L (ref 3–14)
BUN SERPL-MCNC: 16 MG/DL (ref 7–30)
CALCIUM SERPL-MCNC: 9.3 MG/DL (ref 8.5–10.1)
CHLORIDE SERPL-SCNC: 107 MMOL/L (ref 94–109)
CHOLEST SERPL-MCNC: 206 MG/DL
CO2 SERPL-SCNC: 23 MMOL/L (ref 20–32)
CREAT SERPL-MCNC: 0.62 MG/DL (ref 0.52–1.04)
CREAT UR-MCNC: 139 MG/DL
GFR SERPL CREATININE-BSD FRML MDRD: >90 ML/MIN/1.7M2
GLUCOSE SERPL-MCNC: 81 MG/DL (ref 70–99)
HDLC SERPL-MCNC: 78 MG/DL
LDLC SERPL CALC-MCNC: 102 MG/DL
MICROALBUMIN UR-MCNC: 19 MG/L
MICROALBUMIN/CREAT UR: 13.45 MG/G CR (ref 0–25)
NONHDLC SERPL-MCNC: 128 MG/DL
POTASSIUM SERPL-SCNC: 3.6 MMOL/L (ref 3.4–5.3)
SODIUM SERPL-SCNC: 141 MMOL/L (ref 133–144)
TRIGL SERPL-MCNC: 129 MG/DL
TSH SERPL DL<=0.005 MIU/L-ACNC: 3.19 MU/L (ref 0.4–4)

## 2017-09-26 PROCEDURE — 84443 ASSAY THYROID STIM HORMONE: CPT | Performed by: FAMILY MEDICINE

## 2017-09-26 PROCEDURE — 82043 UR ALBUMIN QUANTITATIVE: CPT | Performed by: FAMILY MEDICINE

## 2017-09-26 PROCEDURE — 80048 BASIC METABOLIC PNL TOTAL CA: CPT | Performed by: FAMILY MEDICINE

## 2017-09-26 PROCEDURE — 99214 OFFICE O/P EST MOD 30 MIN: CPT | Performed by: FAMILY MEDICINE

## 2017-09-26 PROCEDURE — 36415 COLL VENOUS BLD VENIPUNCTURE: CPT | Performed by: FAMILY MEDICINE

## 2017-09-26 PROCEDURE — 80061 LIPID PANEL: CPT | Performed by: FAMILY MEDICINE

## 2017-09-26 RX ORDER — SIMVASTATIN 10 MG
TABLET ORAL
Qty: 45 TABLET | Refills: 3 | Status: SHIPPED | OUTPATIENT
Start: 2017-09-26

## 2017-09-26 RX ORDER — LEVOTHYROXINE SODIUM 75 UG/1
75 TABLET ORAL DAILY
Qty: 90 TABLET | Refills: 3 | Status: SHIPPED | OUTPATIENT
Start: 2017-09-26 | End: 2018-10-22

## 2017-09-26 RX ORDER — AMLODIPINE BESYLATE 10 MG/1
TABLET ORAL
Qty: 90 TABLET | Refills: 1 | Status: SHIPPED | OUTPATIENT
Start: 2017-09-26 | End: 2018-08-14

## 2017-09-26 RX ORDER — DEXTROAMPHETAMINE SACCHARATE, AMPHETAMINE ASPARTATE MONOHYDRATE, DEXTROAMPHETAMINE SULFATE AND AMPHETAMINE SULFATE 5; 5; 5; 5 MG/1; MG/1; MG/1; MG/1
20 CAPSULE, EXTENDED RELEASE ORAL DAILY
Qty: 30 CAPSULE | Refills: 0 | Status: SHIPPED | OUTPATIENT
Start: 2017-09-26 | End: 2017-11-01

## 2017-09-26 ASSESSMENT — ANXIETY QUESTIONNAIRES
2. NOT BEING ABLE TO STOP OR CONTROL WORRYING: NOT AT ALL
3. WORRYING TOO MUCH ABOUT DIFFERENT THINGS: NOT AT ALL
1. FEELING NERVOUS, ANXIOUS, OR ON EDGE: SEVERAL DAYS
GAD7 TOTAL SCORE: 1
6. BECOMING EASILY ANNOYED OR IRRITABLE: NOT AT ALL
5. BEING SO RESTLESS THAT IT IS HARD TO SIT STILL: NOT AT ALL
7. FEELING AFRAID AS IF SOMETHING AWFUL MIGHT HAPPEN: NOT AT ALL

## 2017-09-26 ASSESSMENT — PATIENT HEALTH QUESTIONNAIRE - PHQ9
SUM OF ALL RESPONSES TO PHQ QUESTIONS 1-9: 3
5. POOR APPETITE OR OVEREATING: NOT AT ALL

## 2017-09-26 ASSESSMENT — PAIN SCALES - GENERAL: PAINLEVEL: NO PAIN (0)

## 2017-09-26 NOTE — MR AVS SNAPSHOT
After Visit Summary   9/26/2017    Dottie Webb    MRN: [id number]           Patient Information     Date Of Birth          1960        Visit Information        Provider Department      9/26/2017 2:20 PM Iraida Stark MD [facility]        Today's Diagnoses     Attention deficit disorder (ADD) without hyperactivity    -  1    Essential hypertension with goal blood pressure less than 140/90        Acquired hypothyroidism        Visit for screening mammogram          Care Instructions    Schedule for mammogram  Schedule for non fasting labs and physical in 6 months            Follow-ups after your visit        Follow-up notes from your care team     Return in about 6 months (around 3/26/2018) for Non-Fasting lab work, Physical Exam.      Future tests that were ordered for you today     Open Future Orders        Priority Expected Expires Ordered    MA Screening Digital Bilateral Routine  9/26/2018 9/26/2017            Who to contact     If you have questions or need follow up information about today's clinic visit or your schedule please contact [facility] directly at [phone].  Normal or non-critical lab and imaging results will be communicated to you by [employer], letter or phone within 4 business days after the clinic has received the results. If you do not hear from us within 7 days, please contact the clinic through Shopal[employer] or phone. If you have a critical or abnormal lab result, we will notify you by phone as soon as possible.  Submit refill requests through [employer] or call your pharmacy and they will forward the refill request to us. Please allow 3 business days for your refill to be completed.          Additional Information About Your Visit        [employer] Information     [employer] is an electronic gateway that provides easy, online access to your medical records. With [employer], you can request a clinic appointment, read your test results, renew a  prescription or communicate with your care team.     To sign up for [employer] visit the website at [email or web address]   You will be asked to enter the access code listed below, as well as some personal information. Please follow the directions to create your username and password.     Your access code is: [email or web address]  Expires: 2017  3:27 PM     Your access code will  in 90 days. If you need help or a new code, please contact your [facility] or call [phone] for assistance.        Care EveryWhere ID     This is your Care EveryWhere ID. This could be used by other organizations to access your [facility] medical records  [id number]        Your Vitals Were     Pulse Temperature Pulse Oximetry BMI (Body Mass Index)          75 97.5  F (36.4  C) (Oral) 97% 24.01 kg/m2         Blood Pressure from Last 3 Encounters:   17 110/64   17 118/78   16 (!) 142/94    Weight from Last 3 Encounters:   17 138 lb 12.8 oz (63 kg)   17 139 lb 8 oz (63.3 kg)   16 143 lb 9.6 oz (65.1 kg)                 Where to get your medicines      These medications were sent to [place] [address] [place] [facility] [place] - [address] [facility]  [address] [facility] [address]     Phone:  [phone]     amLODIPine 10 MG tablet    levothyroxine 75 MCG tablet         Some of these will need a paper prescription and others can be bought over the counter.  Ask your nurse if you have questions.     Bring a paper prescription for each of these medications     amphetamine-dextroamphetamine 20 MG per 24 hr capsule          Primary Care Provider Office Phone # Fax #    Iraida Stark [phone] [address] [address] [place], [place] [address] [facility] [address]        Equal Access to Services     SHARI FLOYD : Caryn lorenzana Somodesto, waaxda luqadaha, qaybta kaalmascott juan. [facility] [phone].    ATENCIÓN: Si  jairo ramos, tiene a mathis disposición servicios gratuitos de asistencia lingüística. Terese richards [phone].    We comply with applicable federal civil rights laws and [place] laws. We do not discriminate on the basis of race, color, national origin, age, disability sex, sexual orientation or gender identity.            Thank you!     Thank you for choosing [facility]  for your care. Our goal is always to provide you with excellent care. Hearing back from our patients is one way we can continue to improve our services. Please take a few minutes to complete the written survey that you may receive in the mail after your visit with us. Thank you!             Your Updated Medication List - Protect others around you: Learn how to safely use, store and throw away your medicines at www.disposemymeds.org.          This list is accurate as of: 9/26/17  3:27 PM.  Always use your most recent med list.                   Brand Name Dispense Instructions for use Diagnosis    ACE NOT PRESCRIBED (INTENTIONAL)      by Other route continuous prn.        amLODIPine 10 MG tablet    NORVASC    90 tablet    TAKE ONE TABLET BY MOUTH ONE TIME DAILY.  ASCEND MANU ONLY    Essential hypertension with goal blood pressure less than 140/90       amphetamine-dextroamphetamine 20 MG per 24 hr capsule    ADDERALL XR    30 capsule    Take 1 capsule (20 mg) by mouth daily    Attention deficit disorder (ADD) without hyperactivity       aspirin 81 MG tablet     100 tablet    Take 1 tablet by mouth daily.    Hyperlipidemia LDL goal <130       CLARITIN 10 MG capsule   Generic drug:  loratadine      1 CAPSULE DAILY PRN        CO Q 10 PO      Take 1 capsule by mouth daily Takes when she remembers        levothyroxine 75 MCG tablet    SYNTHROID/LEVOTHROID    90 tablet    Take 1 tablet (75 mcg) by mouth daily    Acquired hypothyroidism       OMEGA 3-6-9 PO      Take 1 capsule by mouth daily        PROBIOTIC DAILY PO      Take 1 capsule by  mouth daily Takes when she remembers        simvastatin 10 MG tablet    ZOCOR    45 tablet    take one-half tablet by mouth nightly at bedtime.    Hyperlipidemia LDL goal <130

## 2017-09-26 NOTE — PROGRESS NOTES
SUBJECTIVE:   Dottie Webb is a 57 year old female who presents to clinic today for the following health issues:    Medication Followup of amphetamine-dextroamphetamine (ADDERALL XR) 20 MG per 24 hr capsule    Taking Medication as prescribed: yes    Side Effects:  Extends too far into the evening sometimes    Medication Helping Symptoms: yes    Hypertension Follow-up      Outpatient blood pressures are not being checked.    Low Salt Diet: no added salt    Hypothyroidism Follow-up      Since last visit, patient describes the following symptoms: Weight stable, no hair loss, no skin changes, no constipation, no loose stools      Amount of exercise or physical activity: 2-3 days/week for an average of 15-30 minutes    Problems taking medications regularly: No    Medication side effects: none  Diet: low salt      Hyperlipidemia Follow-Up      Rate your low fat/cholesterol diet?: good    Taking statin?  Yes, no muscle aches from statin    Other lipid medications/supplements?:  none          Problem list and histories reviewed & adjusted, as indicated.  Additional history: as documented    Patient Active Problem List   Diagnosis     Hashimoto's thyroiditis     Generalized anxiety disorder     Chronic fatigue     Hyperlipidemia LDL goal <160     Plantar fasciitis     Anxiety     Menopausal syndrome (hot flashes)     Elevated fasting glucose     Acquired hypothyroidism     Microalbuminuria     Essential hypertension with goal blood pressure less than 140/90     Major depressive disorder, recurrent episode, moderate (H)     Insomnia secondary to anxiety     Elevated fasting blood sugar     Family history of breast cancer     Immunization not carried out because of patient refusal     Attention deficit disorder (ADD) without hyperactivity     Past Surgical History:   Procedure Laterality Date     GYN SURGERY      2010 ablation     TUBAL LIGATION  2003       Social History   Substance Use Topics     Smoking status: Former  Smoker     Quit date: 10/1/1999     Smokeless tobacco: Never Used     Alcohol use Yes      Comment: 2-3 drinks per wk     Family History   Problem Relation Age of Onset     C.A.D. Father      Hypertension Father      Lipids Father      CEREBROVASCULAR DISEASE Maternal Grandmother      Hypertension Brother      Lipids Brother      Blood Disease Brother      hepatitis C     CANCER Sister      throat, breast, upper GI     HEART DISEASE Sister      rhythm issues     Hypertension Mother      DIABETES Mother      DIABETES Maternal Grandfather      Prostate Cancer Paternal Grandfather          Current Outpatient Prescriptions   Medication Sig Dispense Refill     amphetamine-dextroamphetamine (ADDERALL XR) 20 MG per 24 hr capsule Take 1 capsule (20 mg) by mouth daily 30 capsule 0     amLODIPine (NORVASC) 10 MG tablet TAKE ONE TABLET BY MOUTH ONE TIME DAILY.  ASCEND [facility] ONLY 90 tablet 1     levothyroxine (SYNTHROID/LEVOTHROID) 75 MCG tablet Take 1 tablet (75 mcg) by mouth daily 90 tablet 3     simvastatin (ZOCOR) 10 MG tablet take one-half tablet by mouth nightly at bedtime. 45 tablet 3     Omega 3-6-9 Fatty Acids (OMEGA 3-6-9 PO) Take 1 capsule by mouth daily       Coenzyme Q10 (CO Q 10 PO) Take 1 capsule by mouth daily Takes when she remembers       Probiotic Product (PROBIOTIC DAILY PO) Take 1 capsule by mouth daily Takes when she remembers       ACE NOT PRESCRIBED, INTENTIONAL, by Other route continuous prn.  0     CLARITIN 10 MG PO CAPS 1 CAPSULE DAILY PRN       [DISCONTINUED] amLODIPine (NORVASC) 10 MG tablet TAKE ONE TABLET BY MOUTH ONE TIME DAILY.  ASCEND [facility] ONLY 90 tablet 1     [DISCONTINUED] simvastatin (ZOCOR) 10 MG tablet take one-half tablet by mouth nightly at bedtime. 45 tablet 3     [DISCONTINUED] levothyroxine (SYNTHROID, LEVOTHROID) 75 MCG tablet Take 1 tablet (75 mcg) by mouth daily 90 tablet 3     aspirin 81 MG tablet Take 1 tablet by mouth daily. (Patient not taking: Reported on 9/26/2017) 100 tablet  3     Allergies   Allergen Reactions     Lisinopril      Severe dry cough     Recent Labs   Lab Test  09/26/17   1443  03/07/17   0854   09/07/16   0927   08/28/15   0842   A1C   --    --    --   5.2   --   5.6   LDL  102*  134*   --   128*   --   112   HDL  78  80   --   67   --   59   TRIG  129  102   --   141   --   241*   ALT   --   27   --   28   --   27   CR  0.62  0.78   --   0.73   < >   --    GFRESTIMATED  >90  76   --   83   < >   --    GFRESTBLACK  >90  >90  African American GFR Calc     --   >90   GFR Calc     < >   --    POTASSIUM  3.6  3.8   --   3.6   < >   --    TSH  3.19  5.38*   < >   --    --   2.03    < > = values in this interval not displayed.      BP Readings from Last 3 Encounters:   09/26/17 110/64   03/07/17 118/78   09/07/16 (!) 142/94    Wt Readings from Last 3 Encounters:   09/26/17 63 kg (138 lb 12.8 oz)   03/07/17 63.3 kg (139 lb 8 oz)   09/07/16 65.1 kg (143 lb 9.6 oz)                  Labs reviewed in EPIC          Reviewed and updated as needed this visit by clinical staff     Reviewed and updated as needed this visit by Provider         ROS:  C: NEGATIVE for fever, chills, change in weight  INTEGUMENTARY/SKIN: NEGATIVE for worrisome rashes, moles or lesions  R: NEGATIVE for significant cough or SOB  CV: NEGATIVE for chest pain, palpitations or peripheral edema  GI: NEGATIVE for nausea, abdominal pain, heartburn, or change in bowel habits  MUSCULOSKELETAL: NEGATIVE for significant arthralgias or myalgia  NEURO: NEGATIVE for weakness, dizziness or paresthesias  ENDOCRINE: NEGATIVE for temperature intolerance, skin/hair changes and Hx thyroid disease  HEME/ALLERGY/IMMUNE: NEGATIVE for bleeding problems  PSYCHIATRIC: NEGATIVE for changes in mood or affect    OBJECTIVE:     /64  Pulse 75  Temp 97.5  F (36.4  C) (Oral)  Wt 63 kg (138 lb 12.8 oz)  SpO2 97%  BMI 24.01 kg/m2  Body mass index is 24.01 kg/(m^2).  GENERAL: healthy, alert and no distress  NECK: no  adenopathy, no asymmetry, masses, or scars and thyroid normal to palpation  RESP: lungs clear to auscultation - no rales, rhonchi or wheezes  CV: regular rate and rhythm, normal S1 S2, no S3 or S4, no murmur, click or rub, no peripheral edema and peripheral pulses strong  MS: no gross musculoskeletal defects noted, no edema  NEURO: Normal strength and tone, mentation intact and speech normal  PSYCH: mentation appears normal, affect normal/bright    Diagnostic Test Results:  No results found for this or any previous visit (from the past 24 hour(s)).    ASSESSMENT/PLAN:     Hyperlipidemia; controlled   Plan:  No changes in the patient's current treatment plan    Hypertension; controlled   Associated with the following complications:    None   Plan:  No changes in the patient's current treatment plan    Hypothyroidism; controlled/euthyroid   Plan:  No changes in the patient's current treatment plan                1. Attention deficit disorder (ADD) without hyperactivity  Stable, continue with Adderall 20 mg daily, recheck in 6 weeks at the time of physical or sooner if needed  - amphetamine-dextroamphetamine (ADDERALL XR) 20 MG per 24 hr capsule; Take 1 capsule (20 mg) by mouth daily  Dispense: 30 capsule; Refill: 0    2. Essential hypertension with goal blood pressure less than 140/90  BP Readings from Last 6 Encounters:   09/26/17 110/64   03/07/17 118/78   09/07/16 (!) 142/94   03/01/16 106/70   08/28/15 102/71   02/13/15 118/62     Low-salt diet, regular exercises, follow for recheck in 6 months or sooner if needed  Blood pressure is at goal, continue withamlodipine 10 mg daily, reviewed normal BMP lab results with patient   Last Basic Metabolic Panel:  Lab Results   Component Value Date     09/26/2017      Lab Results   Component Value Date    POTASSIUM 3.6 09/26/2017     Lab Results   Component Value Date    CHLORIDE 107 09/26/2017     Lab Results   Component Value Date    LEILANI 9.3 09/26/2017     Lab Results    Component Value Date    CO2 23 09/26/2017     Lab Results   Component Value Date    BUN 16 09/26/2017     Lab Results   Component Value Date    CR 0.62 09/26/2017     Lab Results   Component Value Date    GLC 81 09/26/2017       - amLODIPine (NORVASC) 10 MG tablet; TAKE ONE TABLET BY MOUTH ONE TIME DAILY.  ASCEND MANU ONLY  Dispense: 90 tablet; Refill: 1    3. Acquired hypothyroidism  Lab Results   Component Value Date    TSH 3.19 09/26/2017      normal TSH-continue with levothyroxine 75 MCG daily   Recheck in one year or sooner if needed  - levothyroxine (SYNTHROID/LEVOTHROID) 75 MCG tablet; Take 1 tablet (75 mcg) by mouth daily  Dispense: 90 tablet; Refill: 3    4. Visit for screening mammogram    - MA Screening Digital Bilateral; Future    5. Hyperlipidemia LDL goal <160  LDL Cholesterol Calculated   Date Value Ref Range Status   09/26/2017 102 (H) <100 mg/dL Final     Comment:     Above desirable:  100-129 mg/dl  Borderline High:  130-159 mg/dL  High:             160-189 mg/dL  Very high:       >189 mg/dl     ]  LDL is at goal, continue with simvastatin 10 mg daily,  Healthy eating, regular exercises  - simvastatin (ZOCOR) 10 MG tablet; take one-half tablet by mouth nightly at bedtime.  Dispense: 45 tablet; Refill: 3    6. Generalized anxiety disorder  Controlled, continue to monitor      Chart documentation done in part with Dragon Voice recognition Software. Although reviewed after completion, some word and grammatical error may remain.    See Patient Instructions    Iraida Stark MD  [facility]

## 2017-09-26 NOTE — NURSING NOTE
"Chief Complaint   Patient presents with     Recheck Medication       Initial /64  Pulse 75  Temp 97.5  F (36.4  C) (Oral)  Wt 138 lb 12.8 oz (63 kg)  SpO2 97%  BMI 24.01 kg/m2 Estimated body mass index is 24.01 kg/(m^2) as calculated from the following:    Height as of 3/7/17: 5' 3.75\" (1.619 m).    Weight as of this encounter: 138 lb 12.8 oz (63 kg).  BP completed using cuff size: natalie Farris CMA    "

## 2017-09-27 ASSESSMENT — ANXIETY QUESTIONNAIRES: GAD7 TOTAL SCORE: 1

## 2017-10-29 ENCOUNTER — HEALTH MAINTENANCE LETTER (OUTPATIENT)
Age: 57
End: 2017-10-29

## 2017-11-01 ENCOUNTER — TELEPHONE (OUTPATIENT)
Dept: PEDIATRICS | Facility: CLINIC | Age: 57
End: 2017-11-01

## 2017-11-01 DIAGNOSIS — F98.8 ATTENTION DEFICIT DISORDER (ADD) WITHOUT HYPERACTIVITY: ICD-10-CM

## 2017-11-01 RX ORDER — DEXTROAMPHETAMINE SACCHARATE, AMPHETAMINE ASPARTATE MONOHYDRATE, DEXTROAMPHETAMINE SULFATE AND AMPHETAMINE SULFATE 5; 5; 5; 5 MG/1; MG/1; MG/1; MG/1
20 CAPSULE, EXTENDED RELEASE ORAL DAILY
Qty: 30 CAPSULE | Refills: 0 | Status: SHIPPED | OUTPATIENT
Start: 2017-11-01 | End: 2017-12-18

## 2017-11-01 NOTE — TELEPHONE ENCOUNTER
[facility] Call Center    Phone Message    Name of Caller: Dottie    Phone Number: Home number on file [phone] (home)    Best time to return call: Any    May a detailed message be left on voicemail: yes    Relation to patient: Self    Reason for Call: Other: Patient would like to request a refill for amphetamine-dextroamphetamine (ADDERALL XR) 20 MG per 24 hr capsule [[id number]] (Order [id number]) . She will  at check in C when ready. Please advise.      Action Taken: Message routed to:  Primary Care p [phone]

## 2017-12-18 ENCOUNTER — TELEPHONE (OUTPATIENT)
Dept: PEDIATRICS | Facility: CLINIC | Age: 57
End: 2017-12-18

## 2017-12-18 DIAGNOSIS — F98.8 ATTENTION DEFICIT DISORDER (ADD) WITHOUT HYPERACTIVITY: ICD-10-CM

## 2017-12-18 RX ORDER — DEXTROAMPHETAMINE SACCHARATE, AMPHETAMINE ASPARTATE MONOHYDRATE, DEXTROAMPHETAMINE SULFATE AND AMPHETAMINE SULFATE 5; 5; 5; 5 MG/1; MG/1; MG/1; MG/1
20 CAPSULE, EXTENDED RELEASE ORAL DAILY
Qty: 30 CAPSULE | Refills: 0 | Status: SHIPPED | OUTPATIENT
Start: 2017-12-18 | End: 2018-01-16

## 2017-12-18 NOTE — TELEPHONE ENCOUNTER
12.18.17  Patient requesting refill of   amphetamine-dextroamphetamine (ADDERALL XR) 20 MG per 24 hr capsule 30 capsule      Call patient when available and patient will .     Home Phone [phone]   Mobile [phone]

## 2017-12-18 NOTE — TELEPHONE ENCOUNTER
Patient advised Adderall XR 20 mg script placed at the , check in C for her to .    Shannon Alvarenga CMA

## 2017-12-18 NOTE — TELEPHONE ENCOUNTER
amphetamine-dextroamphetamine (ADDERALL XR) 20 MG per 24 hr capsule      Last Written Prescription Date:  11/01/17  Last Fill Quantity: 30,   # refills: 0  Last Office Visit: 09/26/17  Future Office visit:       Routing refill request to provider for review/approval because:  Drug not on the [facility], [facility] or [facility] refill protocol or controlled substance

## 2018-01-16 ENCOUNTER — TELEPHONE (OUTPATIENT)
Dept: PEDIATRICS | Facility: CLINIC | Age: 58
End: 2018-01-16

## 2018-01-16 DIAGNOSIS — F98.8 ATTENTION DEFICIT DISORDER (ADD) WITHOUT HYPERACTIVITY: ICD-10-CM

## 2018-01-16 RX ORDER — DEXTROAMPHETAMINE SACCHARATE, AMPHETAMINE ASPARTATE MONOHYDRATE, DEXTROAMPHETAMINE SULFATE AND AMPHETAMINE SULFATE 5; 5; 5; 5 MG/1; MG/1; MG/1; MG/1
20 CAPSULE, EXTENDED RELEASE ORAL DAILY
Qty: 30 CAPSULE | Refills: 0 | Status: SHIPPED | OUTPATIENT
Start: 2018-01-16 | End: 2018-02-28

## 2018-01-16 NOTE — TELEPHONE ENCOUNTER
Routing to provider to please advise.    amphetamine-dextroamphetamine (ADDERALL XR) 20 MG per 24 hr capsule 30 capsule 0 12/18/2017  No      Sig: Take 1 capsule (20 mg) by mouth daily       Mackenzie Gauthier RN, [facility]

## 2018-01-16 NOTE — TELEPHONE ENCOUNTER
Patient notified and Rx brought to Check in C.    Mackenzie Gauthier RN, [facility]

## 2018-02-28 DIAGNOSIS — F98.8 ATTENTION DEFICIT DISORDER (ADD) WITHOUT HYPERACTIVITY: ICD-10-CM

## 2018-02-28 RX ORDER — DEXTROAMPHETAMINE SACCHARATE, AMPHETAMINE ASPARTATE MONOHYDRATE, DEXTROAMPHETAMINE SULFATE AND AMPHETAMINE SULFATE 5; 5; 5; 5 MG/1; MG/1; MG/1; MG/1
20 CAPSULE, EXTENDED RELEASE ORAL DAILY
Qty: 30 CAPSULE | Refills: 0 | Status: SHIPPED | OUTPATIENT
Start: 2018-02-28 | End: 2018-04-02

## 2018-02-28 NOTE — TELEPHONE ENCOUNTER
[facility] Call Center    Phone Message    May a detailed message be left on voicemail: yes    Reason for Call: Patient requesting paper script for amphetamine-dextroamphetamine (ADDERALL XR) 20 MG per 24 hr capsule [[id number]] (Order [id number]  Requesting a call back to confirm once it is ordered/sent/ready. Thank you    Action Taken: Message routed to:  Primary Care p [phone]

## 2018-03-01 NOTE — TELEPHONE ENCOUNTER
amphetamine-dextroamphetamine (ADDERALL XR) 20 MG per 24 hr capsule      Last Written Prescription Date:  1/16/18  Last Fill Quantity: 30,   # refills: 0  Last Office Visit: 9/26/17  Future Office visit:    Next 5 appointments (look out 90 days)     Mar 27, 2018  1:30 PM CDT   PHYSICAL with Iraida Stark MD   [facility]    [address] [place] [address] [id number] [phone]                   Routing refill request to provider for review/approval because:  Drug not on the [facility], [facility] or [facility] refill protocol or controlled substance

## 2018-03-23 ENCOUNTER — DOCUMENTATION ONLY (OUTPATIENT)
Dept: LAB | Facility: CLINIC | Age: 58
End: 2018-03-23

## 2018-03-23 DIAGNOSIS — E78.5 HYPERLIPIDEMIA LDL GOAL <130: Primary | ICD-10-CM

## 2018-03-23 DIAGNOSIS — Z12.39 BREAST CANCER SCREENING: ICD-10-CM

## 2018-03-23 DIAGNOSIS — Z12.11 SCREENING FOR COLON CANCER: ICD-10-CM

## 2018-03-23 NOTE — PROGRESS NOTES
Patient is scheduled for non fasting labs on 3/27/18.    Only lab on health maintenance is ALT.    Routing pended order to provider to please review and edit.    Mackenzie Gauthier RN, [facility]

## 2018-03-23 NOTE — PROGRESS NOTES
BRIDGER    Called patient.  She now states her insurance changed and is no longer able to see Dr. Stark or come to this clinic.  Will cancel signed labs and appts and let Dr. Stark know.    Mackenzie Gauthier RN, [facility]

## 2018-04-02 ENCOUNTER — TELEPHONE (OUTPATIENT)
Dept: PEDIATRICS | Facility: CLINIC | Age: 58
End: 2018-04-02

## 2018-04-02 DIAGNOSIS — F98.8 ATTENTION DEFICIT DISORDER (ADD) WITHOUT HYPERACTIVITY: ICD-10-CM

## 2018-04-02 RX ORDER — DEXTROAMPHETAMINE SACCHARATE, AMPHETAMINE ASPARTATE MONOHYDRATE, DEXTROAMPHETAMINE SULFATE AND AMPHETAMINE SULFATE 5; 5; 5; 5 MG/1; MG/1; MG/1; MG/1
20 CAPSULE, EXTENDED RELEASE ORAL DAILY
Qty: 30 CAPSULE | Refills: 0 | Status: SHIPPED | OUTPATIENT
Start: 2018-04-02 | End: 2018-04-02

## 2018-04-02 RX ORDER — DEXTROAMPHETAMINE SACCHARATE, AMPHETAMINE ASPARTATE MONOHYDRATE, DEXTROAMPHETAMINE SULFATE AND AMPHETAMINE SULFATE 5; 5; 5; 5 MG/1; MG/1; MG/1; MG/1
20 CAPSULE, EXTENDED RELEASE ORAL DAILY
Qty: 30 CAPSULE | Refills: 0 | Status: SHIPPED | OUTPATIENT
Start: 2018-04-02

## 2018-04-02 NOTE — TELEPHONE ENCOUNTER
Routing refill request to provider for review/approval because:  Drug not on the [facility] refill protocol     amphetamine-dextroamphetamine (ADDERALL XR) 20 MG per 24 hr capsule 30 capsule 0 2/28/2018  No   Sig: Take 1 capsule (20 mg) by mouth daily     Last Ov with Dr. Stark: 9/26/2017    No future apts.     Merna Vila RN

## 2018-04-02 NOTE — TELEPHONE ENCOUNTER
Patient advised of information below per Tamiko Alvarado.  Patient stated understanding and will call back to schedule an appt with Dr. Stark.  Adderall XR 20 mg script at the  for patient to .    Shannon Alvarenga CMA

## 2018-04-02 NOTE — TELEPHONE ENCOUNTER
Please inform patient, refill/prescription approved and when prescription is ready to be picked up at .   Needs a follow up with Dr Stark before next refill

## 2018-08-14 DIAGNOSIS — I10 ESSENTIAL HYPERTENSION WITH GOAL BLOOD PRESSURE LESS THAN 140/90: ICD-10-CM

## 2018-08-14 NOTE — LETTER
August 15, 2018      Dottie Webb  [address]  [place] [facility] [place] [facility] [address]              Dear Dottie,      We recently received a call from your pharmacy requesting a refill of your medication. We have provided a 30 day refill of your medication, amLODIPine (NORVASC) 10 MG tablet, as requested.      A review of your chart indicates that you are due for an annual physical with Dr. Stark.     We have authorized a one time refill of your medication to allow time for you to schedule.     Please come in fasting for the appointment. Fasting entails nothing to eat or drink 8 hours prior to your appointment; with the exception on water. You may take your medication the day of the appointment.    Please call the clinic to schedule your appointment.    Thank you for taking an active role in your healthcare.      Sincerely,    [facility]

## 2018-08-15 RX ORDER — AMLODIPINE BESYLATE 10 MG/1
TABLET ORAL
Qty: 30 TABLET | Refills: 0 | Status: SHIPPED | OUTPATIENT
Start: 2018-08-15

## 2018-08-15 NOTE — TELEPHONE ENCOUNTER
amLODIPine (NORVASC) 10 MG tablet 90 tablet 1 9/26/2017  No   Sig: TAKE ONE TABLET BY MOUTH ONE TIME DAILY.  ASCEND MANU ONLY       Last OV with Dr. Stark: 9/26/2017 Return in about 6 months (around 3/26/2018) for Non-Fasting lab work, Physical Exam.      No future apts.     BP Readings from Last 3 Encounters:   09/26/17 110/64   03/07/17 118/78   09/07/16 (!) 142/94     Creatinine   Date Value Ref Range Status   09/26/2017 0.62 0.52 - 1.04 mg/dL Final     Calcium Channel Blockers Protocol Passed8/14 5:14 PM   Blood pressure under 140/90 in past 12 months    Recent (12 mo) or future (30 days) visit within the authorizing provider's specialty    Patient is age 18 or older    No active pregnancy on record    Normal serum creatinine on file in past 12 months    No positive pregnancy test in past 12 months     Patient due for physical with Dr. Stark. Irma refill. Patient notified by letter.     Merna Vila RN

## 2018-10-22 DIAGNOSIS — E03.9 ACQUIRED HYPOTHYROIDISM: ICD-10-CM

## 2018-10-22 RX ORDER — LEVOTHYROXINE SODIUM 75 UG/1
TABLET ORAL
Qty: 30 TABLET | Refills: 0 | Status: SHIPPED | OUTPATIENT
Start: 2018-10-22

## 2018-10-22 NOTE — TELEPHONE ENCOUNTER
Last office visit:  9/26/17    Irma Refill    levothyroxine filled for 30 days.    Patient is due for: annual exam and fasting labs    Future Appointments scheduled: none    Follow up with patient:  Reminder letter sent to patient.    Alanna Decker RN,   [facility]

## 2018-10-22 NOTE — LETTER
October 22, 2018      Dottie Webb  [address]  [place] [facility] [address]        Dear Dottie,     We recently received a refill request from your pharmacy requesting a refill of :     Signed Prescriptions:                        Disp   Refills    levothyroxine (SYNTHROID/LEVOTHROID) 75 MC*30 tab*0        Sig: TAKE 1 TABLET BY MOUTH DAILY        A review of your chart indicates that your last office visit was on 9/26/17.  An appointment is required every year with your provider. We have authorized one time 30 day refill of your medication to allow time for you to schedule.     Please call the clinic at [phone], or log in to your [employer] account at [email or web address] to schedule your appointment within that time frame so there is no delay in next month's refill.    Thank you for taking an active role in your healthcare.    Sincerely,    Alanna DEXTER RN,   [facility], [facility]      If you need assistance with your [employer] log in, please call [phone].